# Patient Record
Sex: FEMALE | Race: WHITE | NOT HISPANIC OR LATINO | Employment: FULL TIME | URBAN - METROPOLITAN AREA
[De-identification: names, ages, dates, MRNs, and addresses within clinical notes are randomized per-mention and may not be internally consistent; named-entity substitution may affect disease eponyms.]

---

## 2017-01-09 ENCOUNTER — GENERIC CONVERSION - ENCOUNTER (OUTPATIENT)
Dept: OTHER | Facility: OTHER | Age: 27
End: 2017-01-09

## 2017-02-24 ENCOUNTER — ALLSCRIPTS OFFICE VISIT (OUTPATIENT)
Dept: OTHER | Facility: OTHER | Age: 27
End: 2017-02-24

## 2017-03-27 ENCOUNTER — ALLSCRIPTS OFFICE VISIT (OUTPATIENT)
Dept: OTHER | Facility: OTHER | Age: 27
End: 2017-03-27

## 2017-03-27 DIAGNOSIS — G43.009 MIGRAINE WITHOUT AURA AND WITHOUT STATUS MIGRAINOSUS, NOT INTRACTABLE: ICD-10-CM

## 2017-03-27 DIAGNOSIS — F41.9 ANXIETY DISORDER: ICD-10-CM

## 2017-03-27 DIAGNOSIS — Z13.6 ENCOUNTER FOR SCREENING FOR CARDIOVASCULAR DISORDERS: ICD-10-CM

## 2017-04-05 ENCOUNTER — GENERIC CONVERSION - ENCOUNTER (OUTPATIENT)
Dept: OTHER | Facility: OTHER | Age: 27
End: 2017-04-05

## 2017-08-22 ENCOUNTER — GENERIC CONVERSION - ENCOUNTER (OUTPATIENT)
Dept: OTHER | Facility: OTHER | Age: 27
End: 2017-08-22

## 2017-10-07 ENCOUNTER — ALLSCRIPTS OFFICE VISIT (OUTPATIENT)
Dept: OTHER | Facility: OTHER | Age: 27
End: 2017-10-07

## 2017-10-28 NOTE — PROGRESS NOTES
Assessment    1  Lives with parents   2  Acute sinusitis (461 9) (J01 90)   3  Migraine headache without aura (346 10) (G43 009)   · MRI brain wnl 4/20/16    Plan  Acute sinusitis    · DrRx Zithromax Z-Joselo 250 MG #6 pill pack; as directed    Discussion/Summary    flu-like illness with superimposed sinusitis  Z-PakIbuprofen alternating with Tylenol, as neededIncrease fluidsNote given for work  Return to office if symptoms worsen or persist  Patient voices understanding  Use additional form of birth control for this month Continue same therapy for migraines  Possible side effects of new medications were reviewed with the patient/guardian today  The treatment plan was reviewed with the patient/guardian  The patient/guardian understands and agrees with the treatment plan     Self Referrals: No      Chief Complaint  C/O generalized body aches, sore throat, PND, head congestion and pressure  Dry cough and crackling in ears  Onset of symptoms 4 days ago  History of Present Illness  HPI: On Topamax for migraines  Uses tryptan for abortive therapy  days ago had a migraine  Also felt very achy  Then sore throat  Getting worse since then  Using Mucinex and Nyquil without relief  2 jobs  Using Aspirin without temporary relief  Review of Systems   Constitutional: as noted in HPI   ENT: as noted in HPI  Cardiovascular: no complaints of slow or fast heart rate, no chest pain, no palpitations, no leg claudication or lower extremity edema  Respiratory: no complaints of shortness of breath, no wheezing, no dyspnea on exertion, no orthopnea or PND  Breasts: no complaints of breast pain, breast lump or nipple discharge  Gastrointestinal: no complaints of abdominal pain, no constipation, no nausea or diarrhea, no vomiting, no bloody stools  Genitourinary: no complaints of dysuria, no incontinence, no pelvic pain, no dysmenorrhea, no vaginal discharge or abnormal vaginal bleeding  Musculoskeletal: as noted in HPI  Integumentary: no complaints of skin rash or lesion, no itching or dry skin, no skin wounds  Neurological: no complaints of headache, no confusion, no numbness or tingling, no dizziness or fainting  Active Problems  1  Allergic rhinitis (477 9) (J30 9)   2  Anxiety (300 00) (F41 9)   3  Depression (311) (F32 9)   4  Iron deficiency anemia (280 9) (D50 9)   5  Migraine headache without aura (346 10) (G43 009)   6  Scar of face (709 2) (L90 5)    Past Medical History  1  History of Cough (786 2) (R05)   2  History of Dysfunction of Eustachian tube, unspecified laterality (381 81) (H69 80)   3  History of acute sinusitis (V12 69) (Z87 09)   4  History of anemia (V12 3) (Z86 2)    Family History  Mother    1  Family history of Sarcoidosis  Father    2  Family history of Hypertension (V17 49)  Maternal Grandmother    3  Family history of Diabetes Mellitus (V18 0)  Paternal Grandfather    4  Family history of Cancer    Social History     · Alcohol Use (History)   · OCCASIONAL   · Being A Social Drinker   · Dental care, regularly   · Denied: History of Drug Use   · Employed   · Lives with parents   · Marital History - Single   · Never a smoker   · Single  The social history was reviewed and updated today  The social history was reviewed and is unchanged  Current Meds   1  ALPRAZolam 0 25 MG Oral Tablet; TAKE 1 TABLET TWICE DAILY AS NEEDED; Therapy: 74Biq8280 to (Evaluate:58Asn7941); Last Rx:82Hjz2032 Ordered   2  Junel FE 1 5/30 1 5-30 MG-MCG Oral Tablet; Therapy: 81SIH8407 to Recorded   3  Naproxen 500 MG Oral Tablet; Therapy: 32ZBX3100 to Recorded   4  Topiramate 50 MG Oral Tablet; 50mg in the morning and 100mg at HS; Therapy: 84HJX4480 to  Requested for: 83DNO8596 Recorded    The medication list was reviewed and updated today  Allergies  1   Paxil TABS    Vitals   Recorded: 87BYR8046 09:41AM   Temperature 99 2 F, Tympanic   Heart Rate 86, L PT   Pulse Quality Regular, L PT   Systolic 96, LUE, Sitting Diastolic 50, LUE, Sitting   Height 5 ft 3 in   Weight 135 lb 12 8 oz   BMI Calculated 24 06   BSA Calculated 1 64       Physical Exam   Constitutional  General appearance: No acute distress, well appearing and well nourished  -- Looks sick but not toxic  Eyes  Conjunctiva and lids: No swelling, erythema or discharge  Pupils and irises: Equal, round and reactive to light  Ears, Nose, Mouth, and Throat  Otoscopic examination: Abnormal  -- Right TM is dull without light reflex  Oropharynx: Abnormal  -- Erythematous  No exudate  Pulmonary  Respiratory effort: No increased work of breathing or signs of respiratory distress  Auscultation of lungs: Clear to auscultation  Lymphatic  Palpation of lymph nodes in neck: Abnormal  -- Tender anterior cervical lymphadenopathy bilaterally    Musculoskeletal  Gait and station: Normal    Psychiatric  Orientation to person, place, and time: Normal    Mood and affect: Normal          Signatures   Electronically signed by : MONTSE Ruth ; Oct  7 2017  7:50PM EST                       (Author)

## 2017-11-16 ENCOUNTER — ALLSCRIPTS OFFICE VISIT (OUTPATIENT)
Dept: OTHER | Facility: OTHER | Age: 27
End: 2017-11-16

## 2017-11-16 LAB — S PYO AG THROAT QL: NEGATIVE

## 2017-11-17 NOTE — PROGRESS NOTES
Assessment    1  Cough (786 2) (R05)   2  ETD (eustachian tube dysfunction) (381 81) (H69 80)   3  Otitis media (382 9) (H66 90)   4  Anxiety (300 00) (F41 9)   5  Acute sinusitis (461 9) (J01 90)   6  Allergic rhinitis (477 9) (J30 9)    Plan  Acute sinusitis    · DrRx Zithromax Z-Joselo 250 MG #6 pill pack  Acute sinusitis, Allergic rhinitis, Anxiety, Cough, ETD (eustachian tube dysfunction), Otitismedia    · Continue with our present treatment plan ; Status:Complete;   Done: 16Nov201705:23PM   · Drink plenty of fluids ; Status:Complete;   Done: 23KTD1160 05:23PM   · Gargle with warm salt water for 5 minutes every 4 hours ; Status:Complete;   Done:16Nov2017 05:23PM   · Follow-up PRN Evaluation and Treatment  Follow-up  Status: Complete  Done:16Nov2017 05:23PM    Discussion/Summary    1  Acute sinusitis 2  Otitis media both infection serous, rapid strep was negative, Zithromax was ordered patient use 2nd former birth control for the entire cycle as she is on oral contraceptivesAllergic rhinitis 4  Eustachian tube dysfunction  Astelin nasal spray prescribedCough  Phenergan DM is ordered drowsiness discussedAnxiety/for flying patient to take a Xanax at bedtime before flight patient to also take a Xanax before getting on the plane, caution not to drive on this medicationTurn to office in 1 week if still symptoms  Possible side effects of new medications were reviewed with the patient/guardian today  The treatment plan was reviewed with the patient/guardian  The patient/guardian understands and agrees with the treatment plan     Self Referrals: No      Chief Complaint  sore throat since 11-12-17 chills and fever non productive cough and post nasal drip  temp  100 rapid strep done today      History of Present Illness  HPI: Patient is having sinus pain and pressure mild otalgia  Positive fever chills no night sweats history as above   In addition patient will be flying next week and is very for full flying we discussed alprazolam use      Review of Systems   Constitutional: as noted in HPI   ENT: as noted in HPI  Cardiovascular: no complaints of slow or fast heart rate, no chest pain, no palpitations, no leg claudication or lower extremity edema  Respiratory: as noted in HPI  Breasts: no complaints of breast pain, breast lump or nipple discharge  Gastrointestinal: no complaints of abdominal pain, no constipation, no nausea or diarrhea, no vomiting, no bloody stools  Genitourinary: no complaints of dysuria, no incontinence, no pelvic pain, no dysmenorrhea, no vaginal discharge or abnormal vaginal bleeding  Musculoskeletal: no complaints of arthralgia, no myalgia, no joint swelling or stiffness, no limb pain or swelling  Integumentary: no complaints of skin rash or lesion, no itching or dry skin, no skin wounds  Neurological: no complaints of headache, no confusion, no numbness or tingling, no dizziness or fainting  Active Problems  1  Acute sinusitis (461 9) (J01 90)   2  Allergic rhinitis (477 9) (J30 9)   3  Anxiety (300 00) (F41 9)   4  Depression (311) (F32 9)   5  Iron deficiency anemia (280 9) (D50 9)   6  Migraine headache without aura (346 10) (G43 009)   7  Scar of face (709 2) (L90 5)    Past Medical History  1  History of Cough (786 2) (R05)   2  History of Dysfunction of Eustachian tube, unspecified laterality (381 81) (H69 80)   3  History of acute sinusitis (V12 69) (Z87 09)   4  History of anemia (V12 3) (Z86 2)    Family History  Mother    1  Family history of Sarcoidosis  Father    2  Family history of Hypertension (V17 49)  Maternal Grandmother    3  Family history of Diabetes Mellitus (V18 0)  Paternal Grandfather    4  Family history of Cancer  Family History Reviewed: The family history was reviewed and updated today         Social History     · Alcohol Use (History)   · OCCASIONAL   · Being A Social Drinker   · Dental care, regularly   · Denied: History of Drug Use   · Employed   · Lives with parents   · Marital History - Single   · Never a smoker   · Single  The social history was reviewed and updated today  Current Meds   1  ALPRAZolam 0 25 MG Oral Tablet; TAKE 1 TABLET TWICE DAILY AS NEEDED; Therapy: 57Fig1998 to (Evaluate:20Nov2017); Last Rx:10Nov2017 Ordered   2  DrRx Zithromax Z-Joselo 250 MG #6 pill pack; as directed; Therapy: 50GRA0203 to (Last Rx:07Oct2017) Ordered   3  Junel FE 1 5/30 1 5-30 MG-MCG Oral Tablet; Therapy: 59SCH0348 to Recorded   4  Naproxen 500 MG Oral Tablet; Therapy: 29FCA7033 to Recorded   5  Topiramate 50 MG Oral Tablet; 50mg in the morning and 100mg at HS; Therapy: 86RBH8055 to  Requested for: 44ECQ7633 Recorded    Allergies  1  Paxil TABS    Vitals   Recorded: 83SSC5789 05:05PM   Temperature 100 F   Heart Rate 88   Respiration 20   Systolic 480   Diastolic 68   Height 5 ft 3 in   Weight 142 lb    BMI Calculated 25 15   BSA Calculated 1 68       Physical Exam   Constitutional  General appearance: No acute distress, well appearing and well nourished  Eyes  Conjunctiva and lids: No swelling, erythema or discharge  Ears, Nose, Mouth, and Throat  External inspection of ears and nose: Normal    Otoscopic examination: Abnormal  -- Both tympanic membranes have fluid and mild injection  Nasal mucosa, septum, and turbinates: Abnormal  -- Positive allergic turbinates positive pansinus tenderness to percussion  Oropharynx: Abnormal  -- Positive purulent postnasal drip positive mild injection negative exudate  Pulmonary  Respiratory effort: No increased work of breathing or signs of respiratory distress  Auscultation of lungs: Clear to auscultation  Cardiovascular  Palpation of heart: Normal PMI, no thrills  Auscultation of heart: Normal rate and rhythm, normal S1 and S2, without murmurs  Lymphatic  Palpation of lymph nodes in neck: Abnormal  -- Positive shotty anterior nodes    Musculoskeletal  Gait and station: Normal    Skin Very warm and moist   Neurologic Negative gross focal motor deficit    Psychiatric  Mood and affect: Normal          Signatures   Electronically signed by : Bernadette Gagnon DO; Nov 16 2017  5:25PM EST                       (Author)

## 2017-11-21 ENCOUNTER — ALLSCRIPTS OFFICE VISIT (OUTPATIENT)
Dept: OTHER | Facility: OTHER | Age: 27
End: 2017-11-21

## 2017-11-22 NOTE — PROGRESS NOTES
Assessment    1  Acute sinusitis (461 9) (J01 90)    Discussion/Summary    1  Acute sinusitis-this has been persistent despite Z-Joselo course  Recommend starting on Levaquin 500 mg daily for 10 days  She will also be given course of prednisone, 50 mg daily for 2 days, 40 mg daily for 2 days diet that diet  She may continue over-the-counter Robitussin or Delsym as necessary for cough  Follow-up in the next week if symptoms still are not been improving  Chief Complaint  c/o continues with not feeling well   cough, losing voice, congestion  antibiotic  on a trip in 1 1/2 weeks  History of Present Illness  This is a 58-year-old female who presents to the office with persistent sinus congestion, head pressure, postnasal drip, and cough  This has been on and off for the past month or 2  In October she received antibiotic, Z-Joselo from Dr Perera Voltaire  In mid November she went through a 2nd Z-Joselo but never felt as though symptoms improved  She is becoming discouraged and wondering what else she can do  Review of Systems   Constitutional: feeling poorly-- and-- feeling tired, but-- no fever-- and-- no chills  Eyes: no eye pain-- and-- no eyesight problems  ENT: earache,-- sore throat,-- nasal discharge-- and-- hoarseness, but-- no nosebleeds  Cardiovascular: no chest pain-- and-- no palpitations  Respiratory: cough, but-- as noted in HPI,-- no shortness of breath-- and-- no wheezing  Gastrointestinal: no abdominal pain  Musculoskeletal: no arthralgias-- and-- no myalgias  Neurological: headache  Hematologic/Lymphatic: swollen glands  Active Problems  1  Acute sinusitis (461 9) (J01 90)   2  Allergic rhinitis (477 9) (J30 9)   3  Anxiety (300 00) (F41 9)   4  Cough (786 2) (R05)   5  Depression (311) (F32 9)   6  ETD (eustachian tube dysfunction) (381 81) (H69 80)   7  Iron deficiency anemia (280 9) (D50 9)   8  Migraine headache without aura (346 10) (G43 009)   9  Otitis media (382 9) (H66 90)   10   Scar of face (709 2) (L90 5)    Past Medical History  1  History of Cough (786 2) (R05)   2  History of Dysfunction of Eustachian tube, unspecified laterality (381 81) (H69 80)   3  History of acute sinusitis (V12 69) (Z87 09)   4  History of anemia (V12 3) (Z86 2)    The active problems and past medical history were reviewed and updated today  Family History  Mother    1  Family history of Sarcoidosis  Father    2  Family history of Hypertension (V17 49)  Maternal Grandmother    3  Family history of Diabetes Mellitus (V18 0)  Paternal Grandfather    4  Family history of Cancer    Social History     · Alcohol Use (History)   · Being A Social Drinker   · Dental care, regularly   · Denied: History of Drug Use   · Employed   · Lives with parents   · Marital History - Single   · Never a smoker   · Single    Current Meds   1  ALPRAZolam 0 25 MG Oral Tablet; TAKE 1 TABLET TWICE DAILY AS NEEDED; Therapy: 43Mrs2814 to (Evaluate:20Nov2017); Last Rx:10Nov2017 Ordered   2  Azelastine HCl - 0 1 % Nasal Solution; use 2 sprays each nostril twice a day; Therapy: 91EQN7684 to (Last Rx:16Nov2017)  Requested for: 83EYE7622 Ordered   3  Junel FE 1 5/30 1 5-30 MG-MCG Oral Tablet; Therapy: 62XDZ7746 to Recorded   4  Naproxen 500 MG Oral Tablet; Therapy: 70JFC3419 to Recorded   5  Topiramate 50 MG Oral Tablet; 50mg in the morning and 100mg at HS; Therapy: 37BAR7956 to  Requested for: 95PMM3954 Recorded    The medication list was reviewed and updated today  Allergies  1  Paxil TABS    Vitals  Vital Signs    Recorded: 21Nov2017 03:41PM   Temperature 61 3 F   Systolic 678   Diastolic 64   Height 5 ft 3 in   Weight 139 lb 8 oz   BMI Calculated 24 71   BSA Calculated 1 66       Physical Exam   Constitutional  General appearance: No acute distress, well appearing and well nourished  Eyes  Conjunctiva and lids: No swelling, erythema or discharge  Pupils and irises: Equal, round and reactive to light     Ears, Nose, Mouth, and Throat External inspection of ears and nose: Normal    Otoscopic examination: Tympanic membranes translucent with normal light reflex  Canals patent without erythema  Nasal mucosa, septum, and turbinates: Abnormal  -- Turbinates are erythematous and edematous bilaterally  Oropharynx: Abnormal  -- Postnasal drip in the posterior pharynx present  Pulmonary  Respiratory effort: No increased work of breathing or signs of respiratory distress  Auscultation of lungs: Clear to auscultation  Cardiovascular  Auscultation of heart: Normal rate and rhythm, normal S1 and S2, without murmurs  Examination of extremities for edema and/or varicosities: Normal    Abdomen  Abdomen: Non-tender, no masses  Liver and spleen: No hepatomegaly or splenomegaly  Lymphatic  Palpation of lymph nodes in neck: No lymphadenopathy  Musculoskeletal  Gait and station: Normal    Digits and nails: Normal without clubbing or cyanosis  Neurologic  Reflexes: 2+ and symmetric  Sensation: No sensory loss  Psychiatric  Orientation to person, place, and time: Normal    Mood and affect: Normal          Signatures   Electronically signed by : Shukri Adorno; Nov 21 2017  3:57PM EST                       (Author)    Electronically signed by :  MONTSE Dangelo ; Nov 21 2017  5:38PM EST

## 2018-01-09 NOTE — MISCELLANEOUS
Message   Recorded as Task   Date: 04/13/2016 05:08 PM, Created By: Ailin Anglin   Task Name: Miscellaneous   Assigned To: Ar Means   Regarding Patient: Pawel Best, Status: Active   CommentJoliprachi Piyush - 13 Apr 2016 5:08 PM     TASK CREATED  pt called, the medication that you perscribed for her migraines pharmacy states she can not crush or cut in half so she is reguesting an alternative to be called into [facility] @ [phone]  Ailin Anglin - 13 Apr 2016 5:09 PM     TASK EDITED  sorry my mistake Alejandro # is [phone]    d/c imitrex and change to maxalt mlt      Plan  Migraine headache without aura    · Rizatriptan Benzoate 10 MG Oral Tablet Dispersible (Rizatriptan Benzoate); TAKE  1 TABLET AT ONSET OF HEADACHE  MAY REPEAT EVERY 2 HOURS AS  NEEDED  MAXIMUM 3 TABLETS IN 24 HOURS    Signatures   Electronically signed by : Onur Fall DO;  Apr 14 2016  9:24AM EST                       (Author)

## 2018-01-10 NOTE — MISCELLANEOUS
Message  Return to work or school:   Dafne Gill is under my professional care  She was seen in my office on 11/21/17   She is able to return to work on  11/22/17       DULCE WOOD PA-C        Signatures   Electronically signed by : Sekou Lyon, [facility]; Nov 21 2017  4:59PM EST                       (Author)

## 2018-01-10 NOTE — RESULT NOTES
Verified Results  * MRI BRAIN WO CONTRAST 66Qhf1972 08:16PM Iris Rodriguez Order Number: [id number]     Test Name Result Flag Reference   MRI BRAIN WO CONTRAST (Report)     This is a summary report  The complete report is available in the patient's medical record  If you cannot access the medical record, please contact the sending organization for a detailed fax or copy  MRI BRAIN WITHOUT CONTRAST     INDICATION: Frequent headaches  Migraine without aura  COMPARISON:  None  TECHNIQUE: Sagittal T1, axial T2, axial FLAIR, axial T1, axial Gradient and axial diffusion imaging  IMAGE QUALITY: Diagnostic  FINDINGS:     BRAIN PARENCHYMA: There is no discrete mass, mass effect or midline shift  No abnormal white matter signal identified  Brainstem and cerebellum demonstrate normal signal  There is no intracranial hemorrhage  There is no evidence of acute infarction and   diffusion imaging is unremarkable  VENTRICLES: The ventricles are normal in size and contour  SELLA AND PITUITARY GLAND: Normal      ORBITS: Normal      PARANASAL SINUSES: Normal      VASCULATURE: Evaluation of the major intracranial vasculature demonstrates appropriate flow voids  CALVARIUM AND SKULL BASE: Normal      EXTRACRANIAL SOFT TISSUES: Normal        IMPRESSION:     No acute infarction, intracranial hemorrhage or mass effect  Workstation performed: [id number]     Signed by:    Praveena Tsai MD   4/21/16

## 2018-01-11 ENCOUNTER — GENERIC CONVERSION - ENCOUNTER (OUTPATIENT)
Dept: OTHER | Facility: OTHER | Age: 28
End: 2018-01-11

## 2018-01-13 VITALS
SYSTOLIC BLOOD PRESSURE: 96 MMHG | BODY MASS INDEX: 24.06 KG/M2 | WEIGHT: 135.8 LBS | HEART RATE: 86 BPM | HEIGHT: 63 IN | DIASTOLIC BLOOD PRESSURE: 50 MMHG | TEMPERATURE: 99.2 F

## 2018-01-13 VITALS
DIASTOLIC BLOOD PRESSURE: 68 MMHG | WEIGHT: 142 LBS | SYSTOLIC BLOOD PRESSURE: 100 MMHG | BODY MASS INDEX: 25.16 KG/M2 | RESPIRATION RATE: 20 BRPM | HEIGHT: 63 IN | TEMPERATURE: 100 F | HEART RATE: 88 BPM

## 2018-01-13 VITALS
SYSTOLIC BLOOD PRESSURE: 110 MMHG | HEIGHT: 63 IN | DIASTOLIC BLOOD PRESSURE: 64 MMHG | BODY MASS INDEX: 24.72 KG/M2 | WEIGHT: 139.5 LBS | TEMPERATURE: 99.2 F

## 2018-01-13 NOTE — MISCELLANEOUS
Message  Return to work or school:   Viky Quijano is under my professional care  She was seen in my office on 11/16/17       Please excuse from work on 11/16/17 and 11/17/17  Bret 100, DO  Signatures   Electronically signed by :  Abigail Yeager, ; Nov 16 2017  5:27PM EST                       (Author)

## 2018-01-14 VITALS
HEIGHT: 63 IN | WEIGHT: 136.38 LBS | RESPIRATION RATE: 16 BRPM | TEMPERATURE: 98.8 F | BODY MASS INDEX: 24.16 KG/M2 | DIASTOLIC BLOOD PRESSURE: 70 MMHG | SYSTOLIC BLOOD PRESSURE: 118 MMHG | HEART RATE: 64 BPM

## 2018-01-15 VITALS
HEIGHT: 63 IN | SYSTOLIC BLOOD PRESSURE: 112 MMHG | BODY MASS INDEX: 23.57 KG/M2 | WEIGHT: 133 LBS | HEART RATE: 62 BPM | DIASTOLIC BLOOD PRESSURE: 60 MMHG

## 2018-03-15 DIAGNOSIS — F41.9 ANXIETY: Primary | ICD-10-CM

## 2018-03-15 PROBLEM — L90.5 SCAR OF FACE: Status: ACTIVE | Noted: 2017-02-24

## 2018-03-15 RX ORDER — ALPRAZOLAM 0.25 MG/1
0.25 TABLET ORAL 2 TIMES DAILY PRN
Qty: 30 TABLET | Refills: 0 | OUTPATIENT
Start: 2018-03-15 | End: 2018-04-04 | Stop reason: SDUPTHER

## 2018-03-15 RX ORDER — ALPRAZOLAM 0.25 MG/1
1 TABLET ORAL 2 TIMES DAILY PRN
COMMUNITY
Start: 2014-09-03 | End: 2018-03-15 | Stop reason: SDUPTHER

## 2018-03-26 RX ORDER — NARATRIPTAN 2.5 MG/1
TABLET ORAL
Refills: 0 | COMMUNITY
Start: 2018-02-28 | End: 2018-07-02 | Stop reason: ALTCHOICE

## 2018-03-26 RX ORDER — AZELASTINE 1 MG/ML
2 SPRAY, METERED NASAL 2 TIMES DAILY
COMMUNITY
Start: 2017-11-16 | End: 2018-07-02 | Stop reason: ALTCHOICE

## 2018-03-26 RX ORDER — NORETHINDRONE ACETATE AND ETHINYL ESTRADIOL 1.5-30(21)
KIT ORAL
COMMUNITY
Start: 2017-03-27 | End: 2018-03-27

## 2018-03-26 RX ORDER — MISOPROSTOL 200 UG/1
TABLET ORAL
Refills: 0 | COMMUNITY
Start: 2018-03-21 | End: 2018-05-23 | Stop reason: ALTCHOICE

## 2018-03-26 RX ORDER — NAPROXEN 500 MG/1
TABLET ORAL
COMMUNITY
Start: 2017-03-27

## 2018-03-26 RX ORDER — TOPIRAMATE 50 MG/1
TABLET, FILM COATED ORAL
COMMUNITY
Start: 2016-11-16

## 2018-03-27 ENCOUNTER — OFFICE VISIT (OUTPATIENT)
Dept: FAMILY MEDICINE CLINIC | Facility: CLINIC | Age: 28
End: 2018-03-27
Payer: COMMERCIAL

## 2018-03-27 VITALS
HEART RATE: 68 BPM | BODY MASS INDEX: 25.4 KG/M2 | SYSTOLIC BLOOD PRESSURE: 110 MMHG | DIASTOLIC BLOOD PRESSURE: 60 MMHG | WEIGHT: 143.4 LBS

## 2018-03-27 DIAGNOSIS — F51.01 PRIMARY INSOMNIA: ICD-10-CM

## 2018-03-27 DIAGNOSIS — F32.9 REACTIVE DEPRESSION: ICD-10-CM

## 2018-03-27 DIAGNOSIS — L30.9 DERMATITIS: ICD-10-CM

## 2018-03-27 DIAGNOSIS — F41.9 ANXIETY: Primary | ICD-10-CM

## 2018-03-27 PROCEDURE — 99214 OFFICE O/P EST MOD 30 MIN: CPT | Performed by: PHYSICIAN ASSISTANT

## 2018-03-27 RX ORDER — TRIAMCINOLONE ACETONIDE 1 MG/G
CREAM TOPICAL 2 TIMES DAILY
Qty: 30 G | Refills: 0 | Status: SHIPPED | OUTPATIENT
Start: 2018-03-27

## 2018-03-27 RX ORDER — BUPROPION HYDROCHLORIDE 150 MG/1
150 TABLET ORAL DAILY
Qty: 30 TABLET | Refills: 1 | Status: SHIPPED | OUTPATIENT
Start: 2018-03-27 | End: 2018-05-23 | Stop reason: ALTCHOICE

## 2018-03-27 NOTE — ASSESSMENT & PLAN NOTE
Uncontrolled  Due to fact that pt is concerned about weight gain will try Wellbutrin 150 mg Xl once daily and recheck in 4-6 weeks

## 2018-03-27 NOTE — PROGRESS NOTES
Assessment/Plan:    Dermatitis  Likely a type of mild eczema  Use OTC hydrocortisone cream 2-3 times a day  If needed may fill Rx for higher steroid  To elpidio 2 times a day  Primary insomnia  Pt  should try the use of benadryl 1-2 (25-50mg)  Anxiety  Uncontrolled  Due to fact that pt is concerned about weight gain will try Wellbutrin 150 mg Xl once daily and recheck in 4-6 weeks  Reactive depression  Treatment per anxiety  Diagnoses and all orders for this visit:    Anxiety  -     buPROPion (WELLBUTRIN XL) 150 mg 24 hr tablet; Take 1 tablet (150 mg total) by mouth daily    Dermatitis  -     triamcinolone (KENALOG) 0 1 % cream; Apply topically 2 (two) times a day    Reactive depression  -     buPROPion (WELLBUTRIN XL) 150 mg 24 hr tablet; Take 1 tablet (150 mg total) by mouth daily    Primary insomnia          Subjective:   CC: c/o dry round area on side x 1 month  C/o increased anxiety, pt  Would like to discuss a refill of xanax, pt  Does not want to be on a daily medication for this  [facility]   Patient ID: Betsy Colon is a 28 y o  female  Patient here today mainly because about a month ago she noticed a slight itchy full spot on her right upper buttock and then another 1 appeared shortly after  Her she is concerned because her mom has psoriasis and her uncle has psoriasis  It is slightly itchy she is put putting only lotion on  She has no history of eczema asthma or allergies  No one else has including her ex-boyfriend  She also is here today with a C/o increase in anxiety  She states that just in the past month her dog had a horrible infection and needed surgery and a tooth, her boyfriend broke up with her and she is trying to find an apartment and living with her parents  She states that she is needing to take the Xanax 2 at bedtime just to sleep    Patient states that she had been on Paxil and Lexapro in the past but did not like either 1 of them due to weight gain and she really hates to take something on a daily basis but she does not want to continue to take her Xanax every day either  She wants to start something but it needs to be weight neutral  No SI  The following portions of the patient's history were reviewed and updated as appropriate: allergies, current medications, past family history, past medical history, past social history, past surgical history and problem list     Review of Systems   Constitutional: Negative  HENT: Negative  Eyes: Negative  Respiratory: Negative  Cardiovascular: Negative  Gastrointestinal: Negative  Endocrine: Negative  Genitourinary: Negative  Musculoskeletal: Negative  Skin: Positive for rash  Allergic/Immunologic: Negative  Neurological: Negative  Hematological: Negative  Psychiatric/Behavioral: Positive for sleep disturbance  Negative for suicidal ideas  The patient is nervous/anxious  Objective:      Vitals:    03/27/18 1232   BP: 110/60   BP Location: Left arm   Patient Position: Sitting   Pulse: 68   Weight: 65 kg (143 lb 6 4 oz)            Physical Exam   Constitutional: She is oriented to person, place, and time  She appears well-developed and well-nourished  No distress  HENT:   Head: Normocephalic and atraumatic  Eyes: Conjunctivae are normal  Right eye exhibits no discharge  Left eye exhibits no discharge  Neck: Neck supple  Carotid bruit is not present  Cardiovascular: Normal rate and regular rhythm  Pulmonary/Chest: Breath sounds normal  No respiratory distress  Neurological: She is alert and oriented to person, place, and time  Skin: Skin is warm and dry  She is not diaphoretic  Psychiatric: She has a normal mood and affect  Her behavior is normal  Judgment and thought content normal    Nursing note and vitals reviewed

## 2018-03-27 NOTE — ASSESSMENT & PLAN NOTE
Likely a type of mild eczema  Use OTC hydrocortisone cream 2-3 times a day  If needed may fill Rx for higher steroid  To elpidio 2 times a day

## 2018-03-27 NOTE — PATIENT INSTRUCTIONS
Problem List Items Addressed This Visit     Anxiety - Primary     Uncontrolled  Due to fact that pt is concerned about weight gain will try Wellbutrin 150 mg Xl once daily and recheck in 4-6 weeks  Relevant Medications    buPROPion (WELLBUTRIN XL) 150 mg 24 hr tablet    Reactive depression     Treatment per anxiety  Relevant Medications    buPROPion (WELLBUTRIN XL) 150 mg 24 hr tablet    Dermatitis     Likely a type of mild eczema  Use OTC hydrocortisone cream 2-3 times a day  If needed may fill Rx for higher steroid  To elpidio 2 times a day  Relevant Medications    triamcinolone (KENALOG) 0 1 % cream    Primary insomnia     Pt  should try the use of benadryl 1-2 (25-50mg)

## 2018-03-29 DIAGNOSIS — F41.9 ANXIETY: ICD-10-CM

## 2018-03-29 NOTE — TELEPHONE ENCOUNTER
Pt requesting a refill for her Xanax, Pt states she was here the other day for an appt but Rx was never refilled, May we do this?

## 2018-04-04 RX ORDER — ALPRAZOLAM 0.25 MG/1
0.25 TABLET ORAL 2 TIMES DAILY PRN
Qty: 30 TABLET | Refills: 0 | OUTPATIENT
Start: 2018-04-04 | End: 2018-05-23 | Stop reason: SDUPTHER

## 2018-05-22 PROBLEM — R87.610 PAP SMEAR ABNORMALITY OF CERVIX WITH ASCUS FAVORING BENIGN: Status: ACTIVE | Noted: 2017-03-20

## 2018-05-22 PROBLEM — G43.719 INTRACTABLE CHRONIC MIGRAINE WITHOUT AURA AND WITHOUT STATUS MIGRAINOSUS: Status: RESOLVED | Noted: 2017-01-09 | Resolved: 2018-05-22

## 2018-05-22 PROBLEM — G43.719 INTRACTABLE CHRONIC MIGRAINE WITHOUT AURA AND WITHOUT STATUS MIGRAINOSUS: Status: ACTIVE | Noted: 2017-01-09

## 2018-05-23 ENCOUNTER — OFFICE VISIT (OUTPATIENT)
Dept: FAMILY MEDICINE CLINIC | Facility: CLINIC | Age: 28
End: 2018-05-23
Payer: COMMERCIAL

## 2018-05-23 VITALS
BODY MASS INDEX: 23.35 KG/M2 | SYSTOLIC BLOOD PRESSURE: 126 MMHG | HEART RATE: 64 BPM | WEIGHT: 131.8 LBS | DIASTOLIC BLOOD PRESSURE: 62 MMHG

## 2018-05-23 DIAGNOSIS — F51.01 PRIMARY INSOMNIA: ICD-10-CM

## 2018-05-23 DIAGNOSIS — F32.9 REACTIVE DEPRESSION: ICD-10-CM

## 2018-05-23 DIAGNOSIS — L30.9 DERMATITIS: Primary | ICD-10-CM

## 2018-05-23 DIAGNOSIS — F41.9 ANXIETY: ICD-10-CM

## 2018-05-23 PROBLEM — H57.04 DILATED PUPIL: Status: ACTIVE | Noted: 2018-04-11

## 2018-05-23 PROCEDURE — 99214 OFFICE O/P EST MOD 30 MIN: CPT | Performed by: PHYSICIAN ASSISTANT

## 2018-05-23 RX ORDER — ALPRAZOLAM 0.5 MG/1
0.5 TABLET ORAL 2 TIMES DAILY PRN
Qty: 60 TABLET | Refills: 0 | Status: SHIPPED | OUTPATIENT
Start: 2018-05-23 | End: 2018-08-02 | Stop reason: SDUPTHER

## 2018-05-23 RX ORDER — TOPIRAMATE 100 MG/1
100 TABLET, FILM COATED ORAL 2 TIMES DAILY
COMMUNITY
Start: 2018-04-11 | End: 2019-04-11

## 2018-05-23 RX ORDER — RIZATRIPTAN BENZOATE 10 MG/1
TABLET ORAL
Refills: 2 | COMMUNITY
Start: 2018-04-11

## 2018-05-23 RX ORDER — ONDANSETRON 4 MG/1
TABLET, FILM COATED ORAL
COMMUNITY
Start: 2018-04-19 | End: 2019-10-15

## 2018-05-23 NOTE — PROGRESS NOTES
Assessment/Plan:    Reactive depression  Currently stable  Off Wellbutrin  Anxiety  Will increase xanax strength to  Mg and pt will track use and f/u in 4-6 weeks  Will consider use of buspar if needed as SSRI use will cause wt gain  Diagnoses and all orders for this visit:    Dermatitis    Anxiety  -     ALPRAZolam (XANAX) 0 5 mg tablet; Take 1 tablet (0 5 mg total) by mouth 2 (two) times a day as needed for anxiety for up to 30 days    Primary insomnia    Reactive depression    Other orders  -     topiramate (TOPAMAX) 100 mg tablet; Take 100 mg by mouth 2 (two) times a day  -     rizatriptan (MAXALT) 10 MG tablet; TAKE ONE TABLET (10MG TOTAL) BY MOUTH ONCE AS NEEDED FOR MIGRAINE  MAY REPEAT IN 2 HOURS IF UNRESOLVED  DO NOT EXCEED 30 MG IN 24 HOURS  -     ondansetron (ZOFRAN) 4 mg tablet;   -     Levonorgestrel 13 5 MG IUD; 1 each by Intrauterine route          Subjective: Follow up to discuss anxiety and anxiety medication  Pt  States medication does not seems to be working  Patient ID: Luca Gary is a 28 y o  female  Pt  here for recheck on her anxiety and depression  She had her topamax increased by neuro by 50 mg since her last visit  She has now gotten her own place w/o her BF who broke up with her with her aguirre dog  She now drops the 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 work every morning  She is adjusting to her new life as a single woman  She felt the Wellbutrin made her worse and she was crying daily and much more emotional so she stopped it about 1 5 weeks ago  Since then she has had tow major panic attacks  She does have the xanax to use as needed but she always uses 2 of the  25 to work  The past two attacks have been when she forgot to take he kay dose of topamax  She only has 2 xanax left  She is definately dealing more with anxiety than depression now that more time has passed          The following portions of the patient's history were reviewed and updated as appropriate: allergies, current medications, past family history, past medical history, past social history, past surgical history and problem list     Review of Systems   Constitutional: Negative  HENT: Negative  Eyes: Negative  Respiratory: Negative  Cardiovascular: Negative  Gastrointestinal: Negative  Endocrine: Negative  Genitourinary: Negative  Musculoskeletal: Negative  Skin: Negative  Allergic/Immunologic: Negative  Neurological: Negative  Hematological: Negative  Psychiatric/Behavioral: Negative for suicidal ideas  The patient is nervous/anxious  Objective:      Vitals:    05/23/18 0746   BP: 126/62   BP Location: Left arm   Patient Position: Sitting   Pulse: 64   Weight: 59 8 kg (131 lb 12 8 oz)            Physical Exam   Constitutional: She is oriented to person, place, and time  She appears well-developed and well-nourished  No distress  HENT:   Head: Normocephalic and atraumatic  Eyes: Conjunctivae are normal  Right eye exhibits no discharge  Left eye exhibits no discharge  Neck: Neck supple  Carotid bruit is not present  Cardiovascular: Normal rate and regular rhythm  Pulmonary/Chest: Effort normal and breath sounds normal    Neurological: She is alert and oriented to person, place, and time  Skin: Skin is warm and dry  She is not diaphoretic  Psychiatric: She has a normal mood and affect  Her speech is normal and behavior is normal  Judgment and thought content normal  Cognition and memory are normal    Nursing note and vitals reviewed

## 2018-05-23 NOTE — ASSESSMENT & PLAN NOTE
Will increase xanax strength to  Mg and pt will track use and f/u in 4-6 weeks  Will consider use of buspar if needed as SSRI use will cause wt gain

## 2018-05-23 NOTE — PATIENT INSTRUCTIONS
Problem List Items Addressed This Visit     Anxiety     Will increase xanax strength to  Mg and pt will track use and f/u in 4-6 weeks  Will consider use of buspar if needed as SSRI use will cause wt gain  Relevant Medications    ALPRAZolam (XANAX) 0 5 mg tablet    Reactive depression     Currently stable  Off Wellbutrin           Relevant Medications    ALPRAZolam (XANAX) 0 5 mg tablet    Dermatitis - Primary    Primary insomnia

## 2018-06-30 ENCOUNTER — OFFICE VISIT (OUTPATIENT)
Dept: FAMILY MEDICINE CLINIC | Facility: CLINIC | Age: 28
End: 2018-06-30
Payer: COMMERCIAL

## 2018-06-30 VITALS
DIASTOLIC BLOOD PRESSURE: 70 MMHG | SYSTOLIC BLOOD PRESSURE: 128 MMHG | HEART RATE: 68 BPM | WEIGHT: 130.6 LBS | BODY MASS INDEX: 23.13 KG/M2

## 2018-06-30 DIAGNOSIS — R30.0 BURNING WITH URINATION: ICD-10-CM

## 2018-06-30 DIAGNOSIS — N30.00 ACUTE CYSTITIS WITHOUT HEMATURIA: Primary | ICD-10-CM

## 2018-06-30 PROBLEM — R35.0 FREQUENCY OF URINATION: Status: ACTIVE | Noted: 2018-06-30

## 2018-06-30 LAB
SL AMB  POCT GLUCOSE, UA: NEGATIVE
SL AMB LEUKOCYTE ESTERASE,UA: NEGATIVE
SL AMB POCT BILIRUBIN,UA: NEGATIVE
SL AMB POCT BLOOD,UA: NEGATIVE
SL AMB POCT CLARITY,UA: ABNORMAL
SL AMB POCT COLOR,UA: ABNORMAL
SL AMB POCT KETONES,UA: NEGATIVE
SL AMB POCT NITRITE,UA: POSITIVE
SL AMB POCT PH,UA: 7
SL AMB POCT SPECIFIC GRAVITY,UA: 1.01
SL AMB POCT URINE PROTEIN: NEGATIVE
SL AMB POCT UROBILINOGEN: 0.2

## 2018-06-30 PROCEDURE — 81003 URINALYSIS AUTO W/O SCOPE: CPT | Performed by: PHYSICIAN ASSISTANT

## 2018-06-30 PROCEDURE — 99213 OFFICE O/P EST LOW 20 MIN: CPT | Performed by: PHYSICIAN ASSISTANT

## 2018-06-30 RX ORDER — CIPROFLOXACIN 500 MG/1
500 TABLET, FILM COATED ORAL EVERY 12 HOURS SCHEDULED
Qty: 14 TABLET | Refills: 0 | Status: SHIPPED | OUTPATIENT
Start: 2018-06-30 | End: 2018-07-07

## 2018-06-30 NOTE — PATIENT INSTRUCTIONS
Urinary Tract Infection in Pregnancy   WHAT YOU NEED TO KNOW:   A urinary tract infection (UTI) is caused by bacteria that get inside your urinary tract  The urinary tract includes your kidneys and bladder  UTIs are common in women, especially during pregnancy  This is because of changes in your immune system, hormones, and uterus  As your uterus grows, your bladder may not completely empty  Bacteria can grow in the urine left in your bladder and cause a UTI  UTIs during pregnancy can increase your risk for a kidney infection and  labor  DISCHARGE INSTRUCTIONS:   Return to the emergency department if:   · You are urinating very little or not at all  · You have severe pain  · You have a fever and chills  Contact your healthcare provider if:   · You have pain in the sides of your back  · You do not feel better after 2 days of treatment  · You are vomiting  · You have questions or concerns about your condition or care  Medicines:   · Antibiotics  help fight a bacterial infection  · Medicines  may be given to decrease pain and burning when you urinate  They will also help decrease the feeling that you need to urinate often  These medicines will make your urine orange or red  · Take your medicine as directed  Contact your healthcare provider if you think your medicine is not helping or if you have side effects  Tell him or her if you are allergic to any medicine  Keep a list of the medicines, vitamins, and herbs you take  Include the amounts, and when and why you take them  Bring the list or the pill bottles to follow-up visits  Carry your medicine list with you in case of an emergency  Prevent another UTI:   · Urinate when you feel the urge  Do not hold your urine  Urinate as soon as needed  Always urinate after you have sex  This helps flush out bacteria passed during sex  · Drink liquids as directed  Ask how much liquid to drink each day and which liquids are best for you  You may need to drink more fluids than usual to help flush bacteria out of your urinary tract  Do not drink caffeine or carbonated liquids  These drinks can irritate your bladder  Your healthcare provider may recommend cranberry juice to help prevent a UTI  · Wipe from front to back after you urinate or have a bowel movement  This will help prevent germs from getting into your urinary tract through your urethra  · Do pelvic muscle exercises often  Pelvic muscle exercises may help you start and stop urinating  Strong pelvic muscles may help you empty your bladder easier  Squeeze these muscles tightly for 5 seconds like you are trying to hold back urine  Then relax for 5 seconds  Gradually work up to squeezing for 10 seconds  Do 3 sets of 15 repetitions a day, or as directed  Follow up with your healthcare provider as directed: You may need to return for more urine tests  Write down your questions so you remember to ask them during your visits  © 2017 [address] Information is for End User's use only and may not be sold, redistributed or otherwise used for commercial purposes  All illustrations and images included in CareNotes® are the copyrighted property of [employer]  or [employer]  The above information is an  only  It is not intended as medical advice for individual conditions or treatments  Talk to your doctor, nurse or pharmacist before following any medical regimen to see if it is safe and effective for you

## 2018-06-30 NOTE — PROGRESS NOTES
Assessment/Plan:    Acute cystitis without hematuria  Urine nip positive for nitrites and sx classic  Vitals stable, no evidence of pyelo or sepsis  Will do Cipro 500 mg twice a day for 7 days, 7 rather than 3 due to some sinus symptoms as well and Cipro rather than Bactrim as she recently was on Flagyl  If n/v, fever, chills, go to ER       Diagnoses and all orders for this visit:    Acute cystitis without hematuria  -     POCT urine dip auto non-scope  -     Urine culture; Future  -     ciprofloxacin (CIPRO) 500 mg tablet; Take 1 tablet (500 mg total) by mouth every 12 (twelve) hours for 7 days    Burning with urination  -     POCT urine dip auto non-scope  -     Urine culture; Future          Subjective:   CC: c/o frequent urination and burning with urination x 2 days  Pt  Used AZO yesterday and has has slight relief    c/o scratchy throat and congestion x several days  mick     Patient ID: Xiomara Prescott is a 28 y o  female  18-year-old female presents complaining of UTI symptoms for 2 days  She has been experiencing urgency, frequency, and dysuria  She also her some suprapubic discomfort  No fever, chills, nausea, vomiting, flank pain  She is also experiencing some nasal congestion, postnasal drip, cough  Used azo with some relief  Recently treated with flagyl for suspected BV        The following portions of the patient's history were reviewed and updated as appropriate: allergies, current medications, past family history, past medical history, past social history, past surgical history and problem list     Review of Systems   Constitutional: Negative for chills and fever  HENT: Positive for congestion, postnasal drip and rhinorrhea  Negative for ear pain, sinus pain and sinus pressure  Respiratory: Positive for cough  Negative for shortness of breath  Cardiovascular: Negative for chest pain  Gastrointestinal: Negative for abdominal pain, nausea and vomiting     Genitourinary: Positive for dysuria, frequency and urgency  Negative for dyspareunia, flank pain, hematuria, pelvic pain, vaginal discharge and vaginal pain  Musculoskeletal: Negative for back pain  Objective:      Vitals:    06/30/18 0822   BP: 128/70   BP Location: Left arm   Patient Position: Sitting   Pulse: 68   Weight: 59 2 kg (130 lb 9 6 oz)            Physical Exam   Constitutional: She is oriented to person, place, and time  She appears well-developed and well-nourished  No distress  HENT:   Head: Normocephalic and atraumatic  Right Ear: Hearing, tympanic membrane, external ear and ear canal normal  No tenderness  No middle ear effusion  No decreased hearing is noted  Left Ear: Hearing, tympanic membrane, external ear and ear canal normal    Nose: Mucosal edema and rhinorrhea present  Right sinus exhibits no maxillary sinus tenderness and no frontal sinus tenderness  Left sinus exhibits no maxillary sinus tenderness and no frontal sinus tenderness  Mouth/Throat: Mucous membranes are normal  No uvula swelling  Posterior oropharyngeal erythema present  No oropharyngeal exudate, posterior oropharyngeal edema or tonsillar abscesses  Post nasal drip present   Eyes: Conjunctivae and EOM are normal  Pupils are equal, round, and reactive to light  Right eye exhibits no discharge  Left eye exhibits no discharge  No scleral icterus  Neck: Normal range of motion  Neck supple  Cardiovascular: Normal rate, regular rhythm and normal heart sounds  Exam reveals no gallop and no friction rub  No murmur heard  Pulmonary/Chest: Effort normal and breath sounds normal  No respiratory distress  She has no wheezes  She has no rales  Abdominal: Soft  She exhibits no distension and no mass  There is no hepatosplenomegaly  There is tenderness in the suprapubic area  There is no rebound, no guarding and no CVA tenderness  Musculoskeletal: Normal range of motion  Lymphadenopathy:     She has no cervical adenopathy  Neurological: She is alert and oriented to person, place, and time  No cranial nerve deficit  Skin: Skin is warm and dry  No rash noted  She is not diaphoretic  No erythema  No pallor

## 2018-06-30 NOTE — ASSESSMENT & PLAN NOTE
Urine nip positive for nitrites and sx classic  Vitals stable, no evidence of pyelo or sepsis  Will do Cipro 500 mg twice a day for 7 days, 7 rather than 3 due to some sinus symptoms as well and Cipro rather than Bactrim as she recently was on Flagyl   If n/v, fever, chills, go to ER

## 2018-07-02 ENCOUNTER — OFFICE VISIT (OUTPATIENT)
Dept: FAMILY MEDICINE CLINIC | Facility: CLINIC | Age: 28
End: 2018-07-02
Payer: COMMERCIAL

## 2018-07-02 VITALS
WEIGHT: 131 LBS | HEART RATE: 76 BPM | BODY MASS INDEX: 23.21 KG/M2 | SYSTOLIC BLOOD PRESSURE: 108 MMHG | DIASTOLIC BLOOD PRESSURE: 68 MMHG | HEIGHT: 63 IN

## 2018-07-02 DIAGNOSIS — B35.4 TINEA CORPORIS: ICD-10-CM

## 2018-07-02 DIAGNOSIS — T75.3XXA MOTION SICKNESS, INITIAL ENCOUNTER: Primary | ICD-10-CM

## 2018-07-02 DIAGNOSIS — F41.9 ANXIETY: ICD-10-CM

## 2018-07-02 PROCEDURE — 3008F BODY MASS INDEX DOCD: CPT | Performed by: FAMILY MEDICINE

## 2018-07-02 PROCEDURE — 99214 OFFICE O/P EST MOD 30 MIN: CPT | Performed by: FAMILY MEDICINE

## 2018-07-02 RX ORDER — CLOTRIMAZOLE AND BETAMETHASONE DIPROPIONATE 10; .64 MG/G; MG/G
CREAM TOPICAL 2 TIMES DAILY
Qty: 30 G | Refills: 0 | Status: SHIPPED | OUTPATIENT
Start: 2018-07-02 | End: 2019-10-15

## 2018-07-02 RX ORDER — SCOLOPAMINE TRANSDERMAL SYSTEM 1 MG/1
1 PATCH, EXTENDED RELEASE TRANSDERMAL
Qty: 3 PATCH | Refills: 0 | Status: SHIPPED | OUTPATIENT
Start: 2018-07-02 | End: 2019-10-15

## 2018-07-02 NOTE — ASSESSMENT & PLAN NOTE
Patient has a half a bottle of Xanax remaining and wanted a refill to take on her cruise  She stated that she only took 1 or 2 tablets a month and I said then you do not need a refill for the cruise because you have plenty of tablets  She wanted me to talk to Augusto Sparks about a refill

## 2018-07-02 NOTE — PROGRESS NOTES
Assessment/Plan:    Anxiety  Patient has a half a bottle of Xanax remaining and wanted a refill to take on her cruise  She stated that she only took 1 or 2 tablets a month and I said then you do not need a refill for the cruise because you have plenty of tablets  She wanted me to talk to Elia Mishra about a refill  Motion sickness  She was given a prescription for transderm Scop patchers  Tinea corporis  She was given a prescription for Lotrisone cream to apply twice a day for several weeks  Diagnoses and all orders for this visit:    Motion sickness, initial encounter  -     scopolamine (TRANSDERM-SCOP) 1 5 mg/3 days TD 72 hr patch; Place 1 patch on the skin every third day    Anxiety    Tinea corporis  -     clotrimazole-betamethasone (LOTRISONE) 1-0 05 % cream; Apply topically 2 (two) times a day          Subjective: Follow up to medication started at last OV  Pt states she has not been using medication as often as in the past   She also questions possible Rx for upcoming cruise later this month  She has concerns about possible sea sickness  Also has small lump between her breasts that she would like checked  It has been present x 1 year without any changes  -   [facility]     Patient ID: Juventino Haynes is a 28 y o  female  This is a 11-year-old female who comes in requesting a refill of her Xanax  She is going on a cruise and is interested in getting a refill in case she needs it  Upon questioning her, she states she is only taking the Xanax once in a while and she has over half of her last prescription left in the bottle  She wanted a prescription with refills and was told that this medication does not get refilled and that the prescription is addictive and habit-forming  She also has a small rash below her breasts that has been there for many months and does not go away  It appears to be a tinea rash  She is also looking for motion sickness medication to use on the cruise    She was advised not to use the Xanax and the Transderm-Scop together  Her blood pressure is 108/68  The following portions of the patient's history were reviewed and updated as appropriate: allergies, current medications, past family history, past medical history, past social history, past surgical history and problem list     Review of Systems   Constitutional: Negative  HENT: Negative  Eyes: Negative  Respiratory: Negative  Cardiovascular: Negative  Gastrointestinal: Negative  Endocrine: Negative  Genitourinary: Negative  Musculoskeletal: Negative  Skin: Negative  Allergic/Immunologic: Negative  Neurological: Negative  Hematological: Negative  Psychiatric/Behavioral: Negative  Objective:      /68 (BP Location: Left arm, Patient Position: Sitting, Cuff Size: Standard)   Pulse 76   Ht 5' 2 75" (1 594 m)   Wt 59 4 kg (131 lb)   BMI 23 39 kg/m²          Physical Exam   Constitutional: She is oriented to person, place, and time  She appears well-developed and well-nourished  HENT:   Head: Normocephalic  Right Ear: External ear normal    Left Ear: External ear normal    Mouth/Throat: Oropharynx is clear and moist    Eyes: Conjunctivae and EOM are normal  Pupils are equal, round, and reactive to light  Neck: Normal range of motion  Neck supple  Cardiovascular: Normal rate, regular rhythm and normal heart sounds  Pulmonary/Chest: Effort normal and breath sounds normal    Abdominal: Soft  Bowel sounds are normal    Musculoskeletal: Normal range of motion  Neurological: She is alert and oriented to person, place, and time  Skin: Skin is warm  Psychiatric: She has a normal mood and affect   Her behavior is normal  Judgment and thought content normal

## 2018-07-06 LAB
BACTERIA UR CULT: NORMAL
Lab: NORMAL

## 2018-08-02 DIAGNOSIS — F41.9 ANXIETY: ICD-10-CM

## 2018-08-03 NOTE — TELEPHONE ENCOUNTER
I am not comfortable refilling this  Lynette Gillespie comes back on Monday and I would prefer her to take a look at this and see if it needs to be refilled

## 2018-08-07 RX ORDER — ALPRAZOLAM 0.5 MG/1
0.5 TABLET ORAL 2 TIMES DAILY PRN
Qty: 30 TABLET | Refills: 0 | Status: SHIPPED | OUTPATIENT
Start: 2018-08-07 | End: 2018-09-21 | Stop reason: SDUPTHER

## 2018-09-21 DIAGNOSIS — F41.9 ANXIETY: ICD-10-CM

## 2018-09-21 NOTE — TELEPHONE ENCOUNTER
Pt states she had a recent death in the Family and would like this filled ASAP but I did not call it in b/c I was unsure if this was appropriate

## 2018-09-23 RX ORDER — ALPRAZOLAM 0.5 MG/1
0.5 TABLET ORAL 2 TIMES DAILY PRN
Qty: 60 TABLET | Refills: 0 | Status: SHIPPED | OUTPATIENT
Start: 2018-09-23 | End: 2018-12-26 | Stop reason: SDUPTHER

## 2018-10-02 ENCOUNTER — OFFICE VISIT (OUTPATIENT)
Dept: FAMILY MEDICINE CLINIC | Facility: CLINIC | Age: 28
End: 2018-10-02
Payer: COMMERCIAL

## 2018-10-02 VITALS
BODY MASS INDEX: 24.14 KG/M2 | SYSTOLIC BLOOD PRESSURE: 118 MMHG | DIASTOLIC BLOOD PRESSURE: 70 MMHG | HEART RATE: 68 BPM | WEIGHT: 135.2 LBS

## 2018-10-02 DIAGNOSIS — R53.82 CHRONIC FATIGUE: ICD-10-CM

## 2018-10-02 DIAGNOSIS — R42 DIZZINESS: Primary | ICD-10-CM

## 2018-10-02 PROCEDURE — 99214 OFFICE O/P EST MOD 30 MIN: CPT | Performed by: PHYSICIAN ASSISTANT

## 2018-10-02 PROCEDURE — 1036F TOBACCO NON-USER: CPT | Performed by: PHYSICIAN ASSISTANT

## 2018-10-02 RX ORDER — MECLIZINE HCL 12.5 MG/1
12.5 TABLET ORAL EVERY 8 HOURS PRN
Qty: 30 TABLET | Refills: 0 | Status: SHIPPED | OUTPATIENT
Start: 2018-10-02

## 2018-10-02 NOTE — PATIENT INSTRUCTIONS
Problem List Items Addressed This Visit        Other    Dizziness - Primary     Patient does exhibit horizontal nystagmus on exam   Check vestibular evaluation with [facility]  Trial of antivert 12 5 mg three times a day  May increase to 25 mg a dose if needed  Relevant Medications    meclizine (ANTIVERT) 12 5 MG tablet    Other Relevant Orders    Ambulatory referral to Physical Therapy    CBC and differential    Comprehensive metabolic panel    TSH, 3rd generation with Free T4 reflex    Lyme Antibody Profile with reflex to WB    Chronic fatigue     Check labs nonfasting, including Lyme           Relevant Orders    CBC and differential    Comprehensive metabolic panel    TSH, 3rd generation with Free T4 reflex    Lyme Antibody Profile with reflex to WB

## 2018-10-02 NOTE — PROGRESS NOTES
Assessment/Plan:    Chronic fatigue  Check labs nonfasting, including Lyme  Dizziness  Patient does exhibit horizontal nystagmus on exam   Check vestibular evaluation with [facility]  Trial of antivert 12 5 mg three times a day  May increase to 25 mg a dose if needed  Diagnoses and all orders for this visit:    Dizziness  -     Ambulatory referral to Physical Therapy; Future  -     CBC and differential; Future  -     Comprehensive metabolic panel; Future  -     TSH, 3rd generation with Free T4 reflex; Future  -     Lyme Antibody Profile with reflex to WB; Future  -     meclizine (ANTIVERT) 12 5 MG tablet; Take 1 tablet (12 5 mg total) by mouth every 8 (eight) hours as needed for dizziness    Chronic fatigue  -     CBC and differential; Future  -     Comprehensive metabolic panel; Future  -     TSH, 3rd generation with Free T4 reflex; Future  -     Lyme Antibody Profile with reflex to WB; Future          Subjective:   CC: c/o episodes of dizziness over the last several days  Pt  Has not been feeling herself x several wks and has had an increase in anxiety  Pt  Started taking her Topamax again yesterday and is not sure if this making it worse  Pt  Does see neurology on 10/5/18  [facility]     Patient ID: Onesimo Carrillo is a 28 y o  female  Went on a 5 day cruise end July and motion sickness patches made it worse  She felt symptoms of dizziness for 1 5 -2 weeks thereafter and the WNL  Then started with panic attacks intermittently only when driving  Just restarted topamax for her migraines as she just kept forgetting to take it after about 10-15 days  Weak in the knees, dizzy  Did have some mild dizziness over the weekend  Has been on topamax for 1-1 5 years at 250 mg  Seeing neuro on Friday to f/u  Just does not feel good in general    Patient believes that her increasing panic attacks is caused by the fact that she is afraid that while she is driving she is going to get dizzy    She needs to drive talking with someone on the phone to take her mind off of it  She states that at work she is getting episodes of dizziness while working with spinning and needs to put her head between her legs because she feels like she is going to pass out  The following portions of the patient's history were reviewed and updated as appropriate: allergies, current medications, past family history, past medical history, past social history, past surgical history and problem list     Review of Systems   Constitutional: Negative  HENT: Negative  Eyes: Negative  Respiratory: Negative  Cardiovascular: Negative  Gastrointestinal: Negative  Endocrine: Negative  Genitourinary: Negative  Musculoskeletal: Negative  Skin: Negative  Allergic/Immunologic: Negative  Neurological: Positive for dizziness and light-headedness  Hematological: Negative  Psychiatric/Behavioral: The patient is nervous/anxious  Objective:      Vitals:    10/02/18 1806   BP: 118/70   BP Location: Left arm   Patient Position: Sitting   Pulse: 68   Weight: 61 3 kg (135 lb 3 2 oz)            Physical Exam   Constitutional: She is oriented to person, place, and time  She appears well-developed and well-nourished  No distress  HENT:   Head: Normocephalic and atraumatic  Eyes: Conjunctivae are normal  Right eye exhibits no discharge  Left eye exhibits no discharge  Right eye exhibits nystagmus  Left eye exhibits nystagmus  Neck: Neck supple  Carotid bruit is not present  Cardiovascular: Normal rate, regular rhythm and normal heart sounds  Exam reveals no gallop and no friction rub  No murmur heard  Pulmonary/Chest: Effort normal and breath sounds normal  No respiratory distress  She has no wheezes  She has no rales  Neurological: She is alert and oriented to person, place, and time  Skin: Skin is warm and dry  She is not diaphoretic  Psychiatric: She has a normal mood and affect   Judgment normal    Nursing note and vitals reviewed

## 2018-10-02 NOTE — ASSESSMENT & PLAN NOTE
Patient does exhibit horizontal nystagmus on exam   Check vestibular evaluation with [facility]  Trial of antivert 12 5 mg three times a day  May increase to 25 mg a dose if needed

## 2018-12-17 ENCOUNTER — OFFICE VISIT (OUTPATIENT)
Dept: FAMILY MEDICINE CLINIC | Facility: CLINIC | Age: 28
End: 2018-12-17
Payer: COMMERCIAL

## 2018-12-17 VITALS
BODY MASS INDEX: 26.31 KG/M2 | SYSTOLIC BLOOD PRESSURE: 98 MMHG | HEIGHT: 62 IN | TEMPERATURE: 99 F | HEART RATE: 76 BPM | WEIGHT: 143 LBS | DIASTOLIC BLOOD PRESSURE: 60 MMHG

## 2018-12-17 DIAGNOSIS — J01.00 ACUTE NON-RECURRENT MAXILLARY SINUSITIS: Primary | ICD-10-CM

## 2018-12-17 DIAGNOSIS — Z30.41 ENCOUNTER FOR SURVEILLANCE OF CONTRACEPTIVE PILLS: ICD-10-CM

## 2018-12-17 DIAGNOSIS — G43.009 MIGRAINE WITHOUT AURA AND WITHOUT STATUS MIGRAINOSUS, NOT INTRACTABLE: ICD-10-CM

## 2018-12-17 PROBLEM — Z30.40 ENCOUNTER FOR SURVEILLANCE OF CONTRACEPTIVES: Status: ACTIVE | Noted: 2018-12-17

## 2018-12-17 PROCEDURE — 3008F BODY MASS INDEX DOCD: CPT | Performed by: FAMILY MEDICINE

## 2018-12-17 PROCEDURE — 1036F TOBACCO NON-USER: CPT | Performed by: FAMILY MEDICINE

## 2018-12-17 PROCEDURE — 99213 OFFICE O/P EST LOW 20 MIN: CPT | Performed by: FAMILY MEDICINE

## 2018-12-17 RX ORDER — PROCHLORPERAZINE MALEATE 10 MG
10 TABLET ORAL EVERY 6 HOURS PRN
COMMUNITY

## 2018-12-17 RX ORDER — CEFUROXIME AXETIL 500 MG/1
500 TABLET ORAL EVERY 12 HOURS SCHEDULED
Qty: 20 TABLET | Refills: 0 | Status: SHIPPED | OUTPATIENT
Start: 2018-12-17 | End: 2018-12-27

## 2018-12-17 RX ORDER — NORETHINDRONE ACETATE AND ETHINYL ESTRADIOL 1MG-20(21)
1 KIT ORAL DAILY
COMMUNITY

## 2018-12-17 NOTE — PROGRESS NOTES
Assessment/Plan:    Encounter for surveillance of contraceptives  Reviewed antibiotics plus birth control pills  This leads to less effective birth control  Patient understands this  She understands she may need to use an alternate method of contraception for the next cycle  Migraine headache without aura  Patient to follow with Neurology  She does have an MRI scheduled  I would agree with this  It seems like the pattern of headaches it change, or this is related to vertigo like symptoms  In either case, she has failed with physical therapy, so the MRI is quite appropriate  Diagnoses and all orders for this visit:    Acute non-recurrent maxillary sinusitis  Comments:  Patient does appear to have an acute sinus infection  Recommend antibiotics  Patient reports failure before with Bactrim  Ceftin, Mucinex over-the-counter  Orders:  -     cefuroxime (CEFTIN) 500 mg tablet; Take 1 tablet (500 mg total) by mouth every 12 (twelve) hours for 10 days    Migraine without aura and without status migrainosus, not intractable    Encounter for surveillance of contraceptive pills    Other orders  -     norethindrone-ethinyl estradiol (JUNEL FE 1/20) 1-20 MG-MCG per tablet; Take 1 tablet by mouth daily  -     prochlorperazine (COMPAZINE) 10 mg tablet; Take 10 mg by mouth every 6 (six) hours as needed          Subjective: patient c/o body aches, nasal congestion, ear cracking, popping, dry & productive cough, PND x 4 days  Patient is not taking any NSAIDS  Patient has questions about her dizziness  ak     Patient ID: Betsy Colon is a 33 y o  female  Please see chief complaint  Patient is having aches, nasal congestion, ear is cracking, dry productive cough, postnasal drip  The patient does have a significant amount of facial discomfort with this  There is some right upper tooth pain  She had improved somewhat, but then after Friday she got significantly worse      She does have questions about dizziness  She has seen [facility]  Has had some problems before after being on a cruise  Panic seems worse as well  She did mention that she had motion sickness previously, and tried the transderm patch on her most recent cruise in July, but that seemed to make things worse  When she removed the patch it got somewhat better  It was never completely gone, however  Panic worse after return  It seems to come and go  Labs were ordered, but she did not get them yet  Neuro eval was done  They recommended Vestibular rehab  Neuro then ordered MRI  The following portions of the patient's history were reviewed and updated as appropriate: allergies, current medications and problem list     Review of Systems   Constitutional:        Per HPI   HENT:        Per HPI   Neurological:        Per HPI   All other systems reviewed and are negative  Objective:      BP 98/60   Pulse 76   Temp 99 °F (37 2 °C)   Ht 5' 2" (1 575 m)   Wt 64 9 kg (143 lb)   Breastfeeding? No   BMI 26 16 kg/m²          Physical Exam   Constitutional: She appears well-developed and well-nourished  HENT:   Head: Normocephalic and atraumatic  Right Ear: External ear normal    Left Ear: External ear normal    Nose: Nose normal  Right sinus exhibits no maxillary sinus tenderness and no frontal sinus tenderness  Left sinus exhibits no maxillary sinus tenderness and no frontal sinus tenderness  Mouth/Throat: Oropharynx is clear and moist    Cardiovascular: Normal rate, regular rhythm and normal heart sounds  Pulses:       Carotid pulses are 2+ on the right side, and 2+ on the left side  Pulmonary/Chest: Effort normal and breath sounds normal  She has no wheezes  She has no rales  She exhibits no tenderness  Lymphadenopathy:     She has no cervical adenopathy  Nursing note and vitals reviewed

## 2018-12-17 NOTE — ASSESSMENT & PLAN NOTE
Reviewed antibiotics plus birth control pills  This leads to less effective birth control  Patient understands this  She understands she may need to use an alternate method of contraception for the next cycle

## 2018-12-17 NOTE — ASSESSMENT & PLAN NOTE
Patient to follow with Neurology  She does have an MRI scheduled  I would agree with this  It seems like the pattern of headaches it change, or this is related to vertigo like symptoms  In either case, she has failed with physical therapy, so the MRI is quite appropriate

## 2018-12-17 NOTE — PATIENT INSTRUCTIONS
Problem List Items Addressed This Visit        Cardiovascular and Mediastinum    Migraine headache without aura     Patient to follow with Neurology  She does have an MRI scheduled  I would agree with this  It seems like the pattern of headaches it change, or this is related to vertigo like symptoms  In either case, she has failed with physical therapy, so the MRI is quite appropriate  Other    Encounter for surveillance of contraceptives     Reviewed antibiotics plus birth control pills  This leads to less effective birth control  Patient understands this  She understands she may need to use an alternate method of contraception for the next cycle  Other Visit Diagnoses     Acute non-recurrent maxillary sinusitis    -  Primary    Patient does appear to have an acute sinus infection  Recommend antibiotics  Patient reports failure before with Bactrim  Ceftin, Mucinex over-the-counter      Relevant Medications    cefuroxime (CEFTIN) 500 mg tablet

## 2018-12-26 DIAGNOSIS — F41.9 ANXIETY: ICD-10-CM

## 2018-12-27 RX ORDER — ALPRAZOLAM 0.5 MG/1
TABLET ORAL
Qty: 60 TABLET | Refills: 0 | Status: SHIPPED | OUTPATIENT
Start: 2018-12-27 | End: 2019-02-18 | Stop reason: SDUPTHER

## 2019-01-25 ENCOUNTER — OFFICE VISIT (OUTPATIENT)
Dept: FAMILY MEDICINE CLINIC | Facility: CLINIC | Age: 29
End: 2019-01-25
Payer: COMMERCIAL

## 2019-01-25 VITALS
BODY MASS INDEX: 26.5 KG/M2 | SYSTOLIC BLOOD PRESSURE: 96 MMHG | HEART RATE: 80 BPM | WEIGHT: 144 LBS | RESPIRATION RATE: 20 BRPM | DIASTOLIC BLOOD PRESSURE: 68 MMHG | HEIGHT: 62 IN

## 2019-01-25 DIAGNOSIS — R42 DIZZINESS: ICD-10-CM

## 2019-01-25 DIAGNOSIS — R59.0 CERVICAL LYMPHADENOPATHY: Primary | ICD-10-CM

## 2019-01-25 DIAGNOSIS — F41.9 ANXIETY: ICD-10-CM

## 2019-01-25 PROCEDURE — 3008F BODY MASS INDEX DOCD: CPT | Performed by: PHYSICIAN ASSISTANT

## 2019-01-25 PROCEDURE — 1036F TOBACCO NON-USER: CPT | Performed by: PHYSICIAN ASSISTANT

## 2019-01-25 PROCEDURE — 99214 OFFICE O/P EST MOD 30 MIN: CPT | Performed by: PHYSICIAN ASSISTANT

## 2019-01-25 RX ORDER — TOPIRAMATE 100 MG/1
TABLET, FILM COATED ORAL
COMMUNITY
Start: 2019-01-23

## 2019-01-25 RX ORDER — RIZATRIPTAN BENZOATE 10 MG/1
TABLET ORAL
COMMUNITY
Start: 2018-12-19 | End: 2019-10-15

## 2019-01-25 NOTE — PROGRESS NOTES
Assessment/Plan:  Patient Instructions   1  Cervical adenopathy-this is likely a small lymph node, possibly reactive  No signs of current infection however  Would recommend ultrasound to confirm consistency  2   Presyncopal episodes-recommend patient have blood work completed that was ordered by Lexie Hawkins  If labs are normal consider further evaluation with echocardiogram and Holter monitor  Patient verbalizes understanding and agreement with plan  3   Anxiety-presently stable, no medication changes  Diagnoses and all orders for this visit:    Cervical lymphadenopathy  -     US head neck soft tissue; Future    Dizziness    Anxiety    Other orders  -     topiramate (TOPAMAX) 100 mg tablet; TAKE ONE TABLET BY MOUTH TWO TIMES A DAY  -     rizatriptan (MAXALT) 10 MG tablet; TAKE ONE TABLET (10 MG TOTAL) BY MOUTH ONE TIME AS NEEDED FOR MIGRAINE  MAY REPEAT IN 2 HOURS IF UNRESOLVED  DO NOT EXCEED 30 MG IN 24 HOURS          Subjective: pt here with complains of lump behind neck  Patient ID: Betsy Colon is a 33 y o  female  HPI:  This is a 65-year-old female who presents to the office with concerns over a lump that she noticed in her right neck in the past 2 days  She does not feel that she has added any sickness or illness recently  She has not had any problems with the ear or sinuses  She has not had any fevers or chills  A lump is slightly tender when she is pressing around it  She also has had some feelings of lightheadedness that have been ongoing for several months  She has not yet had blood work that was ordered by Lexie Hawkins completed  The following portions of the patient's history were reviewed and updated as appropriate: allergies, current medications, past family history, past medical history, past social history, past surgical history and problem list     Review of Systems   Constitutional: Negative for chills, fatigue and fever     HENT: Negative for congestion, ear pain and sinus pressure  Nodule of the right neck   Eyes: Negative for visual disturbance  Respiratory: Negative for cough, chest tightness and shortness of breath  Cardiovascular: Negative for chest pain and palpitations  Gastrointestinal: Negative for diarrhea, nausea and vomiting  Endocrine: Negative for polyuria  Genitourinary: Negative for dysuria and frequency  Musculoskeletal: Negative for arthralgias and myalgias  Skin: Negative for pallor and rash  Neurological: Negative for dizziness, weakness, light-headedness, numbness and headaches  Psychiatric/Behavioral: Negative for agitation, behavioral problems and sleep disturbance  All other systems reviewed and are negative  Objective:      Vitals:    01/25/19 1510   BP: 96/68   BP Location: Left arm   Patient Position: Sitting   Cuff Size: Large   Pulse: 80   Resp: 20   Weight: 65 3 kg (144 lb)   Height: 5' 2" (1 575 m)          Physical Exam   Constitutional: She is oriented to person, place, and time  She appears well-developed and well-nourished  No distress  HENT:   Head: Normocephalic and atraumatic  Eyes: Pupils are equal, round, and reactive to light  Conjunctivae are normal    Neck:   Right side of the neck with small nodule which is approximately 0 5 cm, firm, fixed, slightly tender to palpation in the postauricular area  Cardiovascular: Normal rate, normal heart sounds and intact distal pulses  No murmur heard  Pulmonary/Chest: Effort normal  No respiratory distress  She has no wheezes  Neurological: She is alert and oriented to person, place, and time  Skin: Skin is warm  No erythema  Nursing note and vitals reviewed

## 2019-01-25 NOTE — PATIENT INSTRUCTIONS
1   Cervical adenopathy-this is likely a small lymph node, possibly reactive  No signs of current infection however  Would recommend ultrasound to confirm consistency  2   Presyncopal episodes-recommend patient have blood work completed that was ordered by Lexie Hawkins  If labs are normal consider further evaluation with echocardiogram and Holter monitor  Patient verbalizes understanding and agreement with plan  3   Anxiety-presently stable, no medication changes

## 2019-02-04 ENCOUNTER — APPOINTMENT (OUTPATIENT)
Dept: LAB | Facility: CLINIC | Age: 29
End: 2019-02-04
Payer: COMMERCIAL

## 2019-02-04 DIAGNOSIS — R42 DIZZINESS: ICD-10-CM

## 2019-02-04 DIAGNOSIS — R53.82 CHRONIC FATIGUE: ICD-10-CM

## 2019-02-04 LAB
ALBUMIN SERPL BCP-MCNC: 3.5 G/DL (ref 3.5–5)
ALP SERPL-CCNC: 52 U/L (ref 46–116)
ALT SERPL W P-5'-P-CCNC: 20 U/L (ref 12–78)
ANION GAP SERPL CALCULATED.3IONS-SCNC: 5 MMOL/L (ref 4–13)
AST SERPL W P-5'-P-CCNC: 16 U/L (ref 5–45)
BASOPHILS # BLD AUTO: 0.04 THOUSANDS/ΜL (ref 0–0.1)
BASOPHILS NFR BLD AUTO: 1 % (ref 0–1)
BILIRUB SERPL-MCNC: 0.31 MG/DL (ref 0.2–1)
BUN SERPL-MCNC: 17 MG/DL (ref 5–25)
CALCIUM SERPL-MCNC: 8.8 MG/DL (ref 8.3–10.1)
CHLORIDE SERPL-SCNC: 111 MMOL/L (ref 100–108)
CO2 SERPL-SCNC: 25 MMOL/L (ref 21–32)
CREAT SERPL-MCNC: 0.76 MG/DL (ref 0.6–1.3)
EOSINOPHIL # BLD AUTO: 0.08 THOUSAND/ΜL (ref 0–0.61)
EOSINOPHIL NFR BLD AUTO: 2 % (ref 0–6)
ERYTHROCYTE [DISTWIDTH] IN BLOOD BY AUTOMATED COUNT: 13 % (ref 11.6–15.1)
GFR SERPL CREATININE-BSD FRML MDRD: 106 ML/MIN/1.73SQ M
GLUCOSE SERPL-MCNC: 73 MG/DL (ref 65–140)
HCT VFR BLD AUTO: 42.9 % (ref 34.8–46.1)
HGB BLD-MCNC: 13.5 G/DL (ref 11.5–15.4)
IMM GRANULOCYTES # BLD AUTO: 0.01 THOUSAND/UL (ref 0–0.2)
IMM GRANULOCYTES NFR BLD AUTO: 0 % (ref 0–2)
LYMPHOCYTES # BLD AUTO: 2.16 THOUSANDS/ΜL (ref 0.6–4.47)
LYMPHOCYTES NFR BLD AUTO: 41 % (ref 14–44)
MCH RBC QN AUTO: 28.8 PG (ref 26.8–34.3)
MCHC RBC AUTO-ENTMCNC: 31.5 G/DL (ref 31.4–37.4)
MCV RBC AUTO: 92 FL (ref 82–98)
MONOCYTES # BLD AUTO: 0.4 THOUSAND/ΜL (ref 0.17–1.22)
MONOCYTES NFR BLD AUTO: 8 % (ref 4–12)
NEUTROPHILS # BLD AUTO: 2.62 THOUSANDS/ΜL (ref 1.85–7.62)
NEUTS SEG NFR BLD AUTO: 48 % (ref 43–75)
NRBC BLD AUTO-RTO: 0 /100 WBCS
PLATELET # BLD AUTO: 263 THOUSANDS/UL (ref 149–390)
PMV BLD AUTO: 10 FL (ref 8.9–12.7)
POTASSIUM SERPL-SCNC: 4.3 MMOL/L (ref 3.5–5.3)
PROT SERPL-MCNC: 7.1 G/DL (ref 6.4–8.2)
RBC # BLD AUTO: 4.68 MILLION/UL (ref 3.81–5.12)
SODIUM SERPL-SCNC: 141 MMOL/L (ref 136–145)
TSH SERPL DL<=0.05 MIU/L-ACNC: 1.52 UIU/ML (ref 0.36–3.74)
WBC # BLD AUTO: 5.31 THOUSAND/UL (ref 4.31–10.16)

## 2019-02-04 PROCEDURE — 36415 COLL VENOUS BLD VENIPUNCTURE: CPT

## 2019-02-04 PROCEDURE — 86618 LYME DISEASE ANTIBODY: CPT

## 2019-02-04 PROCEDURE — 85025 COMPLETE CBC W/AUTO DIFF WBC: CPT

## 2019-02-04 PROCEDURE — 84443 ASSAY THYROID STIM HORMONE: CPT

## 2019-02-04 PROCEDURE — 80053 COMPREHEN METABOLIC PANEL: CPT

## 2019-02-06 LAB
B BURGDOR IGG SER IA-ACNC: 0.13
B BURGDOR IGM SER IA-ACNC: 0.26

## 2019-02-18 DIAGNOSIS — F41.9 ANXIETY: ICD-10-CM

## 2019-02-19 RX ORDER — ALPRAZOLAM 0.5 MG/1
0.5 TABLET ORAL 2 TIMES DAILY PRN
Qty: 60 TABLET | Refills: 0 | Status: SHIPPED | OUTPATIENT
Start: 2019-02-19 | End: 2019-04-10 | Stop reason: SDUPTHER

## 2019-04-10 DIAGNOSIS — F41.9 ANXIETY: ICD-10-CM

## 2019-04-11 RX ORDER — ALPRAZOLAM 0.5 MG/1
TABLET ORAL
Qty: 30 TABLET | Refills: 0 | Status: SHIPPED | OUTPATIENT
Start: 2019-04-11 | End: 2019-05-13 | Stop reason: SDUPTHER

## 2019-05-13 DIAGNOSIS — F41.9 ANXIETY: ICD-10-CM

## 2019-05-14 RX ORDER — ALPRAZOLAM 0.5 MG/1
TABLET ORAL
Qty: 60 TABLET | Refills: 0 | Status: SHIPPED | OUTPATIENT
Start: 2019-05-14 | End: 2019-08-07 | Stop reason: SDUPTHER

## 2019-08-06 DIAGNOSIS — F41.9 ANXIETY: ICD-10-CM

## 2019-08-06 RX ORDER — ALPRAZOLAM 0.5 MG/1
TABLET ORAL
Qty: 60 TABLET | Refills: 0 | OUTPATIENT
Start: 2019-08-06

## 2019-08-06 NOTE — TELEPHONE ENCOUNTER
Patient was given 60 roughly 2 months ago so she is needing Xanax every day she needs to be seen for re-evaluation

## 2019-08-07 DIAGNOSIS — F41.9 ANXIETY: ICD-10-CM

## 2019-08-07 RX ORDER — ALPRAZOLAM 0.5 MG/1
TABLET ORAL
Qty: 30 TABLET | Refills: 0 | Status: SHIPPED | OUTPATIENT
Start: 2019-08-07 | End: 2019-10-15 | Stop reason: SDUPTHER

## 2019-08-27 PROBLEM — N30.00 ACUTE CYSTITIS WITHOUT HEMATURIA: Status: RESOLVED | Noted: 2018-06-30 | Resolved: 2019-08-27

## 2019-10-15 ENCOUNTER — OFFICE VISIT (OUTPATIENT)
Dept: FAMILY MEDICINE CLINIC | Facility: CLINIC | Age: 29
End: 2019-10-15
Payer: COMMERCIAL

## 2019-10-15 VITALS
TEMPERATURE: 99.3 F | DIASTOLIC BLOOD PRESSURE: 62 MMHG | BODY MASS INDEX: 29.63 KG/M2 | HEIGHT: 62 IN | HEART RATE: 80 BPM | WEIGHT: 161 LBS | SYSTOLIC BLOOD PRESSURE: 98 MMHG

## 2019-10-15 DIAGNOSIS — F41.9 ANXIETY: Primary | ICD-10-CM

## 2019-10-15 DIAGNOSIS — J01.10 ACUTE NON-RECURRENT FRONTAL SINUSITIS: ICD-10-CM

## 2019-10-15 PROBLEM — N83.292 OTHER OVARIAN CYST, LEFT SIDE: Status: ACTIVE | Noted: 2018-07-21

## 2019-10-15 PROBLEM — F51.01 PRIMARY INSOMNIA: Status: RESOLVED | Noted: 2018-03-27 | Resolved: 2019-10-15

## 2019-10-15 PROBLEM — R10.2 PELVIC CRAMPING: Status: ACTIVE | Noted: 2018-07-12

## 2019-10-15 PROCEDURE — 3008F BODY MASS INDEX DOCD: CPT | Performed by: PHYSICIAN ASSISTANT

## 2019-10-15 PROCEDURE — 99214 OFFICE O/P EST MOD 30 MIN: CPT | Performed by: PHYSICIAN ASSISTANT

## 2019-10-15 RX ORDER — ALPRAZOLAM 0.5 MG/1
TABLET ORAL
Qty: 30 TABLET | Refills: 0 | Status: SHIPPED | OUTPATIENT
Start: 2019-10-15 | End: 2019-12-12 | Stop reason: SDUPTHER

## 2019-10-15 RX ORDER — SULFAMETHOXAZOLE AND TRIMETHOPRIM 800; 160 MG/1; MG/1
1 TABLET ORAL EVERY 12 HOURS SCHEDULED
Qty: 20 TABLET | Refills: 0 | Status: SHIPPED | OUTPATIENT
Start: 2019-10-15 | End: 2019-10-25

## 2019-10-15 RX ORDER — DOCUSATE SODIUM 100 MG/1
100 CAPSULE, LIQUID FILLED ORAL 2 TIMES DAILY
COMMUNITY
Start: 2019-09-06 | End: 2020-09-05

## 2019-10-15 NOTE — PATIENT INSTRUCTIONS
Problem List Items Addressed This Visit        Respiratory    Acute non-recurrent frontal sinusitis     Bactrim Ds twice daily as directed  Increase fluids note for work given  Contiue OTC medications  Relevant Medications    sulfamethoxazole-trimethoprim (BACTRIM DS) 800-160 mg per tablet       Other    Anxiety - Primary       Patient is requesting Xanax prescription that she takes as needed  Unfortunately she has received 240 pills of 0 5 Xanax this year in the 270 days which roughly means patient is taking it almost every single day  Pt  states she was taking them more often in specific circumstances like for example her dog passed away, her uncle passed away and breaking up with her BF  PMED checked and appropriate, UDS collected today  Contract signed            Relevant Medications    ALPRAZolam (XANAX) 0 5 mg tablet    Other Relevant Orders    POCT urine drug screen

## 2019-10-15 NOTE — PROGRESS NOTES
Assessment and Plan:    Problem List Items Addressed This Visit        Respiratory    Acute non-recurrent frontal sinusitis     Bactrim Ds twice daily as directed  Increase fluids note for work given  Contiue OTC medications  Relevant Medications    sulfamethoxazole-trimethoprim (BACTRIM DS) 800-160 mg per tablet       Other    Anxiety - Primary       Patient is requesting Xanax prescription that she takes as needed  Unfortunately she has received 240 pills of 0 5 Xanax this year in the 270 days which roughly means patient is taking it almost every single day  Pt  states she was taking them more often in specific circumstances like for example her dog passed away, her uncle passed away and breaking up with her BF  PMED checked and appropriate, UDS collected today  Contract signed  Relevant Medications    ALPRAZolam (XANAX) 0 5 mg tablet    Other Relevant Orders    POCT urine drug screen                 Diagnoses and all orders for this visit:    Anxiety  -     ALPRAZolam (XANAX) 0 5 mg tablet; One tab only if having uncontrolled anxiety on an as needed infrequent basis  -     POCT urine drug screen    Acute non-recurrent frontal sinusitis  -     sulfamethoxazole-trimethoprim (BACTRIM DS) 800-160 mg per tablet; Take 1 tablet by mouth every 12 (twelve) hours for 10 days    Other orders  -     docusate sodium (COLACE) 100 mg capsule; Take 100 mg by mouth 2 (two) times a day  -     Fremanezumab-vfrm 225 MG/1 5ML SOSY; Inject 225 mg under the skin every 28 days              Subjective:      Patient ID: Skyler Patino is a 33 y o  female  CC:    Chief Complaint   Patient presents with    Cold Like Symptoms     patient c/o body aches, sinus headaches, scratchy throat, PND, ear cracking off and on for 1 week  Patient is taking Dayquil and Nyquil and AlkaSelter nighttime  ak    Medication Refill     patient is following up on xanax refill/use   ak       HPI:      Skyler Patino is here for refill of her xanax  She also is sick for the past 1 week  Headaches, sinus pain , ear pain, ST, colored thick yellow/green nasal mucus  Taking OTC day and Nyquil w/o help  Had to take migraine meds today  Body aches and low grade fever  Unable to go to work today  Needs a note  Her current boyfriend is also sick  Pt states she does not use the xanax regularly but only during stressful situations  For example her uncle passes away and her dog  and she broke up with her last boyfriend  She does have an occasional panic attack but this is rare  She does not have trouble sleeping usually or trouble with daily anxiety  She tried [employer] and buspar in the past but they caused SE which was weight gain  The last med we tried was wellbutrin but she did not do well with this either  She does need a refill as she only has one left  She is agreeable to urine drug screen and contract signing  The following portions of the patient's history were reviewed and updated as appropriate: allergies, current medications, past family history, past medical history, past social history, past surgical history and problem list       Review of Systems   Constitutional: Negative  HENT: Positive for ear pain, postnasal drip and sore throat  Eyes: Negative  Respiratory: Negative  Cardiovascular: Negative  Gastrointestinal: Negative  Endocrine: Negative  Genitourinary: Negative  Musculoskeletal: Positive for myalgias  Skin: Negative  Allergic/Immunologic: Negative  Neurological: Positive for headaches  Hematological: Negative  Psychiatric/Behavioral: The patient is nervous/anxious  Data to review:       Objective:    Vitals:    10/15/19 1527   BP: 98/62   Pulse: 80   Temp: 99 3 °F (37 4 °C)   Weight: 73 kg (161 lb)   Height: 5' 2" (1 575 m)        Physical Exam   Constitutional: She is oriented to person, place, and time  She appears well-developed and well-nourished  No distress     HENT:   Head: Normocephalic and atraumatic  Right Ear: Hearing, tympanic membrane, external ear and ear canal normal    Left Ear: Hearing, external ear and ear canal normal  Tympanic membrane is retracted  Nose: Mucosal edema and rhinorrhea present  Mouth/Throat: Posterior oropharyngeal edema and posterior oropharyngeal erythema present  Eyes: Conjunctivae are normal  Right eye exhibits no discharge  Left eye exhibits no discharge  Neck: Neck supple  Carotid bruit is not present  Cardiovascular: Normal rate, regular rhythm and normal heart sounds  Exam reveals no gallop and no friction rub  No murmur heard  Pulmonary/Chest: Effort normal and breath sounds normal  No respiratory distress  She has no wheezes  She has no rales  Lymphadenopathy:     She has cervical adenopathy  Right cervical: Superficial cervical adenopathy present  Left cervical: Superficial cervical adenopathy present  Neurological: She is alert and oriented to person, place, and time  Skin: Skin is warm and dry  She is not diaphoretic  Psychiatric: She has a normal mood and affect  Her speech is normal and behavior is normal  Judgment and thought content normal  Cognition and memory are normal    Nursing note and vitals reviewed

## 2019-10-15 NOTE — ASSESSMENT & PLAN NOTE
Patient is requesting Xanax prescription that she takes as needed  Unfortunately she has received 240 pills of 0 5 Xanax this year in the 270 days which roughly means patient is taking it almost every single day  Pt  states she was taking them more often in specific circumstances like for example her dog passed away, her uncle passed away and breaking up with her BF  PMED checked and appropriate, UDS collected today  Contract signed

## 2019-10-18 LAB
AMPHETAMINES UR QL SCN: NEGATIVE NG/ML
BARBITURATES UR QL SCN: NEGATIVE NG/ML
BENZODIAZ UR QL: NEGATIVE NG/ML
BZE UR QL: NEGATIVE NG/ML
CANNABINOIDS UR QL SCN: NEGATIVE NG/ML
METHADONE UR QL SCN: NEGATIVE NG/ML
METHAQUALONE UR QL: NEGATIVE NG/ML
OPIATES UR QL: NEGATIVE NG/ML
PCP UR QL: NEGATIVE NG/ML
PROPOXYPH UR QL SCN: NEGATIVE NG/ML

## 2019-12-12 DIAGNOSIS — F41.9 ANXIETY: ICD-10-CM

## 2019-12-12 RX ORDER — ALPRAZOLAM 0.5 MG/1
TABLET ORAL
Qty: 30 TABLET | Refills: 0 | Status: SHIPPED | OUTPATIENT
Start: 2019-12-12

## 2021-02-18 ENCOUNTER — TELEPHONE (OUTPATIENT)
Dept: FAMILY MEDICINE CLINIC | Facility: CLINIC | Age: 31
End: 2021-02-18

## 2021-02-18 NOTE — TELEPHONE ENCOUNTER
On 11/7/2019 patient established care with Guille Souza MD    [address] [place] [address] Dr Liliana Tsang, [address] [place] [address]   [phone] (Fax)